# Patient Record
Sex: FEMALE | Race: BLACK OR AFRICAN AMERICAN | NOT HISPANIC OR LATINO | Employment: UNEMPLOYED | URBAN - NONMETROPOLITAN AREA
[De-identification: names, ages, dates, MRNs, and addresses within clinical notes are randomized per-mention and may not be internally consistent; named-entity substitution may affect disease eponyms.]

---

## 2018-10-30 ENCOUNTER — TRANSCRIBE ORDERS (OUTPATIENT)
Dept: PHYSICAL THERAPY | Facility: HOSPITAL | Age: 38
End: 2018-10-30

## 2018-10-30 DIAGNOSIS — M25.551 PAIN IN RIGHT HIP: Primary | ICD-10-CM

## 2018-11-08 ENCOUNTER — HOSPITAL ENCOUNTER (OUTPATIENT)
Dept: PHYSICAL THERAPY | Facility: HOSPITAL | Age: 38
Setting detail: THERAPIES SERIES
Discharge: HOME | End: 2018-11-08

## 2018-11-08 DIAGNOSIS — S72.001A CLOSED FRACTURE OF RIGHT HIP, INITIAL ENCOUNTER (HCC): Primary | ICD-10-CM

## 2018-11-08 PROCEDURE — G8979 MOBILITY GOAL STATUS: HCPCS | Performed by: PHYSICAL THERAPIST

## 2018-11-08 PROCEDURE — 97162 PT EVAL MOD COMPLEX 30 MIN: CPT | Performed by: PHYSICAL THERAPIST

## 2018-11-08 PROCEDURE — G8978 MOBILITY CURRENT STATUS: HCPCS | Performed by: PHYSICAL THERAPIST
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
## 2018-11-20 ENCOUNTER — HOSPITAL ENCOUNTER (OUTPATIENT)
Dept: PHYSICAL THERAPY | Facility: HOSPITAL | Age: 38
Setting detail: THERAPIES SERIES
Discharge: HOME | End: 2018-11-20

## 2018-11-20 DIAGNOSIS — S72.001A CLOSED FRACTURE OF RIGHT HIP, INITIAL ENCOUNTER (HCC): Primary | ICD-10-CM

## 2018-11-20 PROCEDURE — 97110 THERAPEUTIC EXERCISES: CPT | Performed by: PHYSICAL THERAPIST
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
## 2018-11-27 ENCOUNTER — HOSPITAL ENCOUNTER (OUTPATIENT)
Dept: PHYSICAL THERAPY | Facility: HOSPITAL | Age: 38
Setting detail: THERAPIES SERIES
Discharge: HOME | End: 2018-11-27

## 2018-11-27 DIAGNOSIS — S72.001A CLOSED FRACTURE OF RIGHT HIP, INITIAL ENCOUNTER (HCC): Primary | ICD-10-CM

## 2018-11-27 PROCEDURE — 97110 THERAPEUTIC EXERCISES: CPT
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
## 2018-11-29 ENCOUNTER — APPOINTMENT (OUTPATIENT)
Dept: PHYSICAL THERAPY | Facility: HOSPITAL | Age: 38
End: 2018-11-29

## 2018-12-04 ENCOUNTER — HOSPITAL ENCOUNTER (OUTPATIENT)
Dept: PHYSICAL THERAPY | Facility: HOSPITAL | Age: 38
Setting detail: THERAPIES SERIES
Discharge: HOME | End: 2018-12-04

## 2018-12-04 DIAGNOSIS — S72.001A CLOSED FRACTURE OF RIGHT HIP, INITIAL ENCOUNTER (HCC): Primary | ICD-10-CM

## 2018-12-04 PROCEDURE — 97110 THERAPEUTIC EXERCISES: CPT

## 2018-12-04 NOTE — THERAPY TREATMENT NOTE
Outpatient Physical Therapy Ortho Treatment Note  [facility]  Afua Salinas PTA       Patient Name: Lady Quiroz  : 1980  MRN: [id number]  Today's Date: 2018      Visit Date: 2018     Visits:  Insurance Visits Approved: 20 visits  Recert Due: 2018  MD Appt: TBD  Pain: pretreatment 0/10; post treatment 0/10  Improvement: pt is subjectively reporting 0% improvement since initial evaluation    Visit Dx:    ICD-10-CM ICD-9-CM   1. Closed fracture of right hip, initial encounter (CMS/[facility]) S72.001A 820.8       There is no problem list on file for this patient.       Past Medical History:   Diagnosis Date   • Hyperlipidemia    • Hypertension         Past Surgical History:   Procedure Laterality Date   • HIP FRACTURE SURGERY Right 2006   • LAPAROSCOPIC TUBAL LIGATION  10/2016       PT Ortho     Row Name 18 1000       Subjective Comments    Subjective Comments  patient reiterates her fear of water. states that she usually never goes any deeper than her knees. denies pain post treatment. states that the exercises are harder at home on land  -       Precautions and Contraindications    Precautions/Limitations  no known precautions/limitations  -       Subjective Pain    Able to rate subjective pain?  yes  -    Pre-Treatment Pain Level  0  -    Post-Treatment Pain Level  0  -      User Key  (r) = Recorded By, (t) = Taken By, (c) = Cosigned By    Initials Name Provider Type     Afua Salinas PTA Physical Therapy Assistant                      PT Assessment/Plan     Row Name 18 1000          PT Assessment    Assessment Comments  did well despite fear of new therex completed today. pt too uncomfortable in water to allow for bicycling with feet not touching the floor. pt indepenent iwth basic pool exercises  -        PT Plan    PT Frequency  2x/week pool only  -     PT Plan Comments  float steps  -       User Key  (r) = Recorded By, (t) = Taken By,  (c) = Cosigned By    Initials Name Provider Type     Afua Salinas PTA Physical Therapy Assistant              Exercises     Row Name 12/04/18 1000             Subjective Comments    Subjective Comments  patient reiterates her fear of water. states that she usually never goes any deeper than her knees. denies pain post treatment. states that the exercises are harder at home on land  -         Subjective Pain    Able to rate subjective pain?  yes  -      Pre-Treatment Pain Level  0  -      Post-Treatment Pain Level  0  -         Aquatics    Aquatics performed?  Yes  -         Exercise 1    Exercise Name 1  aqua: Cookie Walk fwd, retro, lat  -      Time 1  5 minutes each  -         Exercise 2    Exercise Name 2  aqua: Marching  -      Time 2  2 minutes  -         Exercise 3    Exercise Name 3  aqua: Heel/Toe line walking  -      Time 3  2 minutes  -         Exercise 4    Exercise Name 4  aqua: Hip Circles CW/CCW  -      Reps 4  20 each  -      Additional Comments  B  -         Exercise 5    Exercise Name 5  aqua: B St. Hamcurls  -      Reps 5  20  -MH         Exercise 6    Exercise Name 6  aqua: B St. 3 way hip   -      Reps 6  15 each  -         Exercise 7    Exercise Name 7  aqua: Lunge Cookie Push/Pull  -      Reps 7  20  -MH      Additional Comments  B lead  -         Exercise 8    Exercise Name 8  aqua: Cookie 2H push down  -      Reps 8  20  -MH         Exercise 9    Exercise Name 9  aqua: mini squats  -      Reps 9  20  -MH        User Key  (r) = Recorded By, (t) = Taken By, (c) = Cosigned By    Initials Name Provider Type     Afua Salinas PTA Physical Therapy Assistant                         PT OP Goals     Row Name 12/04/18 1000          PT Short Term Goals    STG Date to Achieve  12/04/18  -     STG 1  I with small HEP instructed aquatically but xould be performed at home.  -     STG 1 Progress  Met  -     STG 2  Able o tolerate 45 minutes of aquatic  PT with no significant increase in pain.  -     STG 2 Progress  Met  -     STG 3  Able to ambulate aquatically F/R/L 5 min each with no increase in pain.  -     STG 3 Progress  Met  -     STG 4  Able to perform 10 SLR 3-way aquatically with no increase in pain.  -     STG 4 Progress  Met  -     STG 5  AROM L hip flexion >= 85°.  -     STG 5 Progress  Ongoing  -        Long Term Goals    LTG 1  Able to perform 15 reps of each aquatic ther ex.  -     LTG 1 Progress  Met;Ongoing  -     LTG 2  Able to perform 5 minutes of bike aquatically with no increase in pain.  -     LTG 2 Progress  Not Met  -     LTG 2 Progress Comments  pt fearful for feet to not touch floor  -     LTG 3  I with all aquatic ther ex.  -     LTG 3 Progress  Ongoing  -     LTG 4  I with progression of aquatic ther ex.  -     LTG 4 Progress  Ongoing  [facility]     LTG 5  D/C with a final HEP and free 30 day fitness formula [employer].  -        Time Calculation    PT Goal Re-Cert Due Date  12/04/18  -       User Key  (r) = Recorded By, (t) = Taken By, (c) = Cosigned By    Initials Name Provider Type     Afua Salinas PTA Physical Therapy Assistant          Therapy Education  Given: HEP, Symptoms/condition management, Pain management, Posture/body mechanics  Program: Reinforced  How Provided: Verbal, Demonstration  Provided to: Patient  Level of Understanding: Verbalized, Demonstrated              Time Calculation:   Start Time: 0930  Stop Time: 1017  Time Calculation (min): 47 min  Total Timed Code Minutes- PT: 47 minute(s)    Therapy Charges for Today     Code Description Service Date Service Provider Modifiers Qty    62285758783  PT THER PROC EA 15 MIN 12/4/2018 Afua Salinas PTA GP 3    04831520986 HC PT THER SUPP EA 15 MIN 12/4/2018 Afua Salinas PTA GP 1                    Afua Salinas PTA  12/4/2018

## 2018-12-06 ENCOUNTER — APPOINTMENT (OUTPATIENT)
Dept: PHYSICAL THERAPY | Facility: HOSPITAL | Age: 38
End: 2018-12-06

## 2018-12-11 ENCOUNTER — APPOINTMENT (OUTPATIENT)
Dept: PHYSICAL THERAPY | Facility: HOSPITAL | Age: 38
End: 2018-12-11

## 2018-12-13 ENCOUNTER — APPOINTMENT (OUTPATIENT)
Dept: PHYSICAL THERAPY | Facility: HOSPITAL | Age: 38
End: 2018-12-13

## 2018-12-14 ENCOUNTER — APPOINTMENT (OUTPATIENT)
Dept: PHYSICAL THERAPY | Facility: HOSPITAL | Age: 38
End: 2018-12-14

## 2018-12-17 ENCOUNTER — APPOINTMENT (OUTPATIENT)
Dept: PHYSICAL THERAPY | Facility: HOSPITAL | Age: 38
End: 2018-12-17

## 2018-12-19 ENCOUNTER — HOSPITAL ENCOUNTER (OUTPATIENT)
Dept: PHYSICAL THERAPY | Facility: HOSPITAL | Age: 38
Setting detail: THERAPIES SERIES
Discharge: HOME | End: 2018-12-19

## 2018-12-19 DIAGNOSIS — S72.001A CLOSED FRACTURE OF RIGHT HIP, INITIAL ENCOUNTER (HCC): Primary | ICD-10-CM

## 2018-12-19 PROCEDURE — 97110 THERAPEUTIC EXERCISES: CPT | Performed by: PHYSICAL THERAPIST

## 2018-12-19 PROCEDURE — G8978 MOBILITY CURRENT STATUS: HCPCS | Performed by: PHYSICAL THERAPIST

## 2018-12-19 PROCEDURE — G8979 MOBILITY GOAL STATUS: HCPCS | Performed by: PHYSICAL THERAPIST

## 2018-12-19 NOTE — THERAPY PROGRESS REPORT/RE-CERT
"    Outpatient Physical Therapy Ortho Progress Note  [facility]  Lexy Mixon, PT, DPT, CSCS       Patient Name: Lady Quiroz  : 1980  MRN: [id number]  Today's Date: 2018      Visit Date: 2018     Pt reports 0/10 pain pre treatment, 0/10 pain post treatment  Reports \"a little\"% of improvement.  Attended 5/8 visits.  Insurance available: 20 visits  Next MD appt: TBD .  Recertification: N/A    Visit Dx:    ICD-10-CM ICD-9-CM   1. Closed fracture of right hip, initial encounter (CMS/[facility]) S72.001A 820.8       Number of days off work: N/A    Changes to medications: None noted.    Changes to MD orders: None noted.     Past Medical History:   Diagnosis Date   • Hyperlipidemia    • Hypertension         Past Surgical History:   Procedure Laterality Date   • HIP FRACTURE SURGERY Right 2006   • LAPAROSCOPIC TUBAL LIGATION  10/2016       PT Ortho     Row Name 18 0900       Subjective Comments    Subjective Comments  patient rpeorts she has been on her cycle and that is why she hasn't been in. She reports she wants to be dne next visit due to some medical procedures int he new year.  -       Precautions and Contraindications    Precautions/Limitations  no known precautions/limitations  -       Subjective Pain    Able to rate subjective pain?  yes  -    Pre-Treatment Pain Level  0  -       Posture/Observations    Posture- WNL  Posture is WNL for B LE  -    Observations  Edema  -    Posture/Observations Comments  No acute distress, viisable edema in R hip in sitting.  -AJ       Hip Special Tests    Trendelenberg sign (gluteus medius weakness)  Bilateral:;Negative  -AJ    CARTER (hip vs SI pathology)  Bilateral:;Negative  -AJ    Cheko test (tightness of ITB)  Bilateral:;Negative  -AJ    Ela’s test (tightness of ITB)  Bilateral:;Negative  -AJ    Hip scour test (labral vs hip pathology)  Bilateral:;Negative  -AJ    Piriformis test (piriformis syndrome)  " Bilateral:;Negative  -AJ    FAIR test (piriformis syndrome)  Bilateral:;Negative  -AJ       Leg Length Test    Apparent  Equal  -AJ       General ROM    GENERAL ROM COMMENTS  AROm of L hip all WNL.  -AJ       Right Lower Ext    Rt Hip ABduction AROM  WNL  -AJ    Rt Hip ADduction AROM  WNL  -AJ    Rt Hip Extension AROM  14°  -AJ    Rt Hip Flexion AROM  87°  -AJ    Rt Hip External Rotation AROM  WFL  -AJ    Rt Hip Internal Rotation AROM  WFL  -AJ       MMT (Manual Muscle Testing)    General MMT Comments  B LE 5/5, some cogwheeling on R.  -AJ       Sensation    Sensation WNL?  WFL  -AJ    Light Touch  No apparent deficits  -AJ       Lower Extremity Flexibility    Hamstrings  Bilateral:;WNL  -AJ    Hip Flexors  Bilateral:;WNL  -AJ       Pathomechanics    Lower Extremity Pathomechanics  -- Nothing abnormal identified  -       Transfers    Comment (Transfers)  I with all transfers, = WB with sit to/from stand with no UE A noted.  -       Gait/Stairs Assessment/Training    Comment (Gait/Stairs)  FWB, non-antalgic gait, slowed gait.  -      User Key  (r) = Recorded By, (t) = Taken By, (c) = Cosigned By    Initials Name Provider Type    Lexy Griggs, PT Physical Therapist         Barriers to Rehab: Include significant or possible arthritic/degenerative changes that have occurred within the joint, The patient's obesity.      Safety Issues: None noted.      PT Assessment/Plan     Row Name 12/19/18 1400          PT Assessment    Functional Limitations  Limitation in home management;Limitations in community activities;Performance in leisure activities;Performance in self-care ADL;Performance in work activities  -     Impairments  Balance;Endurance;Impaired muscle endurance;Impaired muscle power;Pain;Muscle strength;Joint integrity  -     Assessment Comments  patient has met almost all goals and wishes ot be D/C'd at next visit.  -AJ     Rehab Potential  Fair  -     Patient/caregiver participated in  establishment of treatment plan and goals  Yes  -AJ     Patient would benefit from skilled therapy intervention  No  -AJ        PT Plan    PT Frequency  2x/week pool only  -AJ     Predicted Duration of Therapy Intervention (Therapy Eval)  1 week, 1 more visit after today.  -AJ     Planned CPT's?  PT THER PROC EA 15 MIN: 14097;PT THER ACT EA 15 MIN: 82584;PT THER SUPP EA 15 MIN  -     Physical Therapy Interventions (Optional Details)  aquatics exercise;balance training;strengthening;stretching  -     PT Plan Comments  D/C t next visit with a final [facility] free 30 day fitness formula membership.  -       User Key  (r) = Recorded By, (t) = Taken By, (c) = Cosigned By    Initials Name Provider Type    Lexy Griggs, PT Physical Therapist       Other therapeutic activities and/or exercises will be prescribed depending on the patients progress or lack there of.        Exercises     Row Name 12/19/18 0900             Subjective Comments    Subjective Comments  patient rpeorts she has been on her cycle and that is why she hasn't been in. She reports she wants to be dne next visit due to some medical procedures int he new year.  -         Subjective Pain    Able to rate subjective pain?  yes  -      Pre-Treatment Pain Level  0  -         Exercise 1    Exercise Name 1  ROM/strength assessment  -         Exercise 2    Exercise Name 2  aqua: ambulation F/R/L with cookie  -      Time 2  5 min each  -AJ         Exercise 3    Exercise Name 3  aqua: amb marching  -      Time 3  2 minutes  -         Exercise 4    Exercise Name 4  aqua:B Hip Circles CW/CCW  -AJ      Reps 4  20 each  -         Exercise 5    Exercise Name 5  aqua: B St. Hamcurls  -      Reps 5  20  -         Exercise 6    Exercise Name 6  aqua: B St. 3 way hip   -AJ      Reps 6  20 each  -         Exercise 7    Exercise Name 7  aqua: Lunge Cookie Push/Pull  -      Reps 7  20 each  -AJ      Additional Comments  B LE lead  -AJ          Exercise 8    Exercise Name 8  aqua: HR/TR  -      Reps 8  20 each  -         Exercise 9    Exercise Name 9  aqua: mini squats  -      Reps 9  20  -        User Key  (r) = Recorded By, (t) = Taken By, (c) = Cosigned By    Initials Name Provider Type    Lexy Griggs, PT Physical Therapist                         PT OP Goals     Row Name 12/19/18 0900          PT Short Term Goals    STG Date to Achieve  12/04/18  -     STG 1  I with small HEP instructed aquatically but xould be performed at home.  -     STG 1 Progress  Met  -     STG 2  Able o tolerate 45 minutes of aquatic PT with no significant increase in pain.  -     STG 2 Progress  Met  -     STG 3  Able to ambulate aquatically F/R/L 5 min each with no increase in pain.  -     STG 3 Progress  Met  -     STG 4  Able to perform 10 SLR 3-way aquatically with no increase in pain.  -     STG 4 Progress  Met  -     STG 5  AROM L hip flexion >= 85°.  -     STG 5 Progress  Met  -        Long Term Goals    LTG 1  Able to perform 15 reps of each aquatic ther ex.  -     LTG 1 Progress  Met  -     LTG 2  Able to perform 5 minutes of bike aquatically with no increase in pain.  -     LTG 2 Progress  Not Met  -     LTG 2 Progress Comments  Patient defers/refuses deep water due ot fear of water.  -     LTG 3  I with all aquatic ther ex.  -     LTG 3 Progress  Partially Met;Ongoing  -     LTG 4  I with progression of aquatic ther ex.  -     LTG 4 Progress  Partially Met;Ongoing  -     LTG 5  D/C with a final HEP and free 30 day fitness formula [employer]mebership.  -     LTG 5 Progress  Ongoing  -        Time Calculation    PT Goal Re-Cert Due Date  -- N/A  -       User Key  (r) = Recorded By, (t) = Taken By, (c) = Cosigned By    Initials Name Provider Type    Lexy Griggs, TITI Physical Therapist          Therapy Education  Given: HEP, Symptoms/condition management, Pain management, Posture/body  mechanics(POC)  Program: Reinforced  How Provided: Verbal, Demonstration  Provided to: Patient  Level of Understanding: Verbalized, Demonstrated    Outcome Measure Options: Lower Extremity Functional Scale (LEFS)  Lower Extremity Functional Index  Any of your usual work, housework or school activities: Moderate difficulty  Your usual hobbies, recreational or sporting activities: Quite a bit of difficulty  Getting into or out of the bath: A little bit of difficulty  Walking between rooms: No difficulty  Putting on your shoes or socks: No difficulty  Squatting: Extreme difficulty or unable to perform activity  Lifting an object, like a bag of groceries from the floor: Quite a bit of difficulty  Performing light activities around your home: A little bit of difficulty  Performing heavy activities around your home: Extreme difficulty or unable to perform activity  Getting into or out of a car: A little bit of difficulty  Walking 2 blocks: Extreme difficulty or unable to perform activity  Walking a mile: Extreme difficulty or unable to perform activity  Going up or down 10 stairs (about 1 flight of stairs): Quite a bit of difficulty  Standing for 1 hour: Extreme difficulty or unable to perform activity  Sitting for 1 hour: Extreme difficulty or unable to perform activity  Running on even ground: Extreme difficulty or unable to perform activity  Running on uneven ground: Extreme difficulty or unable to perform activity  Making sharp turns while running fast: Extreme difficulty or unable to perform activity  Hopping: Extreme difficulty or unable to perform activity  Rolling over in bed: No difficulty  Total: 26      Time Calculation:   Start Time: 0930  Stop Time: 1015  Time Calculation (min): 45 min  PT Non-Billable Time (min): 1 min(change time for pool)  Total Timed Code Minutes- PT: 44 minute(s)    Therapy Charges for Today     Code Description Service Date Service Provider Modifiers Qty    93287688844 HC PT MOBILITY  CURRENT 12/19/2018 Lexy Mixon, PT GP, CI 1    50611318315 HC PT MOBILITY PROJECTED 12/19/2018 Lexy Mixon, PT GP, CH 1    14006299180 HC PT THER PROC EA 15 MIN 12/19/2018 Lexy Mixon, PT GP 3    [id number] HC PT THER SUPP EA 15 MIN 12/19/2018 Lexy Mixon, PT GP 1          PT G-Codes  Outcome Measure Options: Lower Extremity Functional Scale (LEFS)  Total: 26  Functional Limitation: Mobility: Walking and moving around  Mobility: Walking and Moving Around Current Status (): At least 1 percent but less than 20 percent impaired, limited or restricted  Mobility: Walking and Moving Around Goal Status (): 0 percent impaired, limited or restricted         Lexy Mixon, PT, DPT, CSCS  12/19/2018

## 2018-12-21 ENCOUNTER — HOSPITAL ENCOUNTER (OUTPATIENT)
Dept: PHYSICAL THERAPY | Facility: HOSPITAL | Age: 38
Setting detail: THERAPIES SERIES
Discharge: HOME | End: 2018-12-21

## 2018-12-21 DIAGNOSIS — S72.001A CLOSED FRACTURE OF RIGHT HIP, INITIAL ENCOUNTER (HCC): Primary | ICD-10-CM

## 2018-12-21 PROCEDURE — G8979 MOBILITY GOAL STATUS: HCPCS

## 2018-12-21 PROCEDURE — 97110 THERAPEUTIC EXERCISES: CPT

## 2018-12-21 PROCEDURE — G8980 MOBILITY D/C STATUS: HCPCS

## 2018-12-21 NOTE — THERAPY DISCHARGE NOTE
Outpatient Physical Therapy Ortho Treatment Note/Discharge Summary  [facility]     Patient Name: Lady Quiroz  : 1980  MRN: [id number]  Today's Date: 2018      Visit Date: 2018  Pt reports 6/10 pain pre treatment, 2/10 pain post treatment  Reports 70% of improvement.  Attended 6/9 visits.  Insurance available: 20 visit  Next MD appt: TBD .  Recertification: N/A  Visit Dx:    ICD-10-CM ICD-9-CM   1. Closed fracture of right hip, initial encounter (CMS/[facility]) S72.001A 820.8       There is no problem list on file for this patient.       Past Medical History:   Diagnosis Date   • Hyperlipidemia    • Hypertension         Past Surgical History:   Procedure Laterality Date   • HIP FRACTURE SURGERY Right 2006   • LAPAROSCOPIC TUBAL LIGATION  10/2016       PT Ortho     Row Name 18 1100       Subjective Comments    Subjective Comments  pt aware of d/c this date. pt given free 30 day membership to fitness.  -TL       Precautions and Contraindications    Precautions/Limitations  no known precautions/limitations  -TL       Subjective Pain    Able to rate subjective pain?  yes  -TL    Pre-Treatment Pain Level  6  -TL    Post-Treatment Pain Level  2  -TL    Row Name 18 0900       Subjective Comments    Subjective Comments  patient rpeorts she has been on her cycle and that is why she hasn't been in. She reports she wants to be dne next visit due to some medical procedures int he new year.  -AJ       Precautions and Contraindications    Precautions/Limitations  no known precautions/limitations  -AJ       Subjective Pain    Able to rate subjective pain?  yes  -AJ    Pre-Treatment Pain Level  0  -AJ    Post-Treatment Pain Level  0  -AJ       Posture/Observations    Posture- WNL  Posture is WNL for B LE  -AJ    Observations  Edema  -AJ    Posture/Observations Comments  No acute distress, viisable edema in R hip in sitting.  -AJ       Hip Special Tests    Trendelenberg sign  (gluteus medius weakness)  Bilateral:;Negative  -AJ    CARTER (hip vs SI pathology)  Bilateral:;Negative  -AJ    Cheko test (tightness of ITB)  Bilateral:;Negative  -AJ    Ela’s test (tightness of ITB)  Bilateral:;Negative  -AJ    Hip scour test (labral vs hip pathology)  Bilateral:;Negative  -AJ    Piriformis test (piriformis syndrome)  Bilateral:;Negative  -AJ    FAIR test (piriformis syndrome)  Bilateral:;Negative  -AJ       Leg Length Test    Apparent  Equal  -AJ       General ROM    GENERAL ROM COMMENTS  AROm of L hip all WNL.  -AJ       Right Lower Ext    Rt Hip ABduction AROM  WNL  -AJ    Rt Hip ADduction AROM  WNL  -AJ    Rt Hip Extension AROM  14°  -AJ    Rt Hip Flexion AROM  87°  -AJ    Rt Hip External Rotation AROM  WFL  -AJ    Rt Hip Internal Rotation AROM  WFL  -AJ       MMT (Manual Muscle Testing)    General MMT Comments  B LE 5/5, some cogwheeling on R.  -AJ       Sensation    Sensation WNL?  WFL  -AJ    Light Touch  No apparent deficits  -AJ       Lower Extremity Flexibility    Hamstrings  Bilateral:;WNL  -AJ    Hip Flexors  Bilateral:;WNL  -AJ       Pathomechanics    Lower Extremity Pathomechanics  -- Nothing abnormal identified  -AJ       Transfers    Comment (Transfers)  I with all transfers, = WB with sit to/from stand with no UE A noted.  -AJ       Gait/Stairs Assessment/Training    Comment (Gait/Stairs)  FWB, non-antalgic gait, slowed gait.  -AJ      User Key  (r) = Recorded By, (t) = Taken By, (c) = Cosigned By    Initials Name Provider Type    Lexy Griggs, PT Physical Therapist    Erin Gross PTA Physical Therapy Assistant                      PT Assessment/Plan     Row Name 12/21/18 1100          PT Assessment    Assessment Comments  all goals except long term goal #2 secondary to pt does not feel comfortable in deep end. Pt cannot swim. Pt i with HEP.  -TL        PT Plan    PT Plan Comments  dc  -       User Key  (r) = Recorded By, (t) = Taken By, (c) = Cosigned By     Initials Name Provider Type    Erin Gross PTA Physical Therapy Assistant              Exercises     Row Name 12/21/18 1100             Subjective Comments    Subjective Comments  pt aware of d/c this date. pt given free 30 day membership to fitness.  -TL         Subjective Pain    Able to rate subjective pain?  yes  -TL      Pre-Treatment Pain Level  6  -TL      Post-Treatment Pain Level  2  -TL         Exercise 1    Exercise Name 1  aqua; walk fwd/back/lateral with cookie  -TL      Time 1  5 mins each direction  -TL         Exercise 2    Exercise Name 2  aqua: ms  -TL      Reps 2  20  -TL         Exercise 3    Exercise Name 3  aqua: marching  -TL      Reps 3  20  -TL         Exercise 4    Exercise Name 4  aqua; circles  -TL      Reps 4  20  -TL      Additional Comments  cc/ccw  -TL         Exercise 5    Exercise Name 5  aqua: B St. [place]  -TL      Reps 5  20  -TL         Exercise 6    Exercise Name 6  aqua: B St. 3 way hip   -TL      Reps 6  20  -TL         Exercise 7    Exercise Name 7  aqua: Lunge Cookie Push/Pull  -TL      Reps 7  20  -TL      Additional Comments  changed legs each leg  -TL         Exercise 8    Exercise Name 8  aqua: HR/TR  -TL      Reps 8  20  -TL        User Key  (r) = Recorded By, (t) = Taken By, (c) = Cosigned By    Initials Name Provider Type    Erin Gross PTA Physical Therapy Assistant                         PT OP Goals     Row Name 12/21/18 1100          PT Short Term Goals    STG Date to Achieve  12/04/18  -TL     STG 1  I with small HEP instructed aquatically but xould be performed at home.  -TL     STG 1 Progress  Met  -TL     STG 2  Able o tolerate 45 minutes of aquatic PT with no significant increase in pain.  -TL     STG 2 Progress  Met  -TL     STG 3  Able to ambulate aquatically F/R/L 5 min each with no increase in pain.  -TL     STG 3 Progress  Met  -TL     STG 4  Able to perform 10 SLR 3-way aquatically with no increase in pain.  -TL     STG 4  Progress  Met  -TL     STG 5  AROM L hip flexion >= 85°.  -TL     STG 5 Progress  Met  -TL        Long Term Goals    LTG 1  Able to perform 15 reps of each aquatic ther ex.  -TL     LTG 1 Progress  Met  -TL     LTG 2  Able to perform 5 minutes of bike aquatically with no increase in pain.  -TL     LTG 2 Progress  Not Met  -TL     LTG 3  I with all aquatic ther ex.  -TL     LTG 3 Progress  Met  -TL     LTG 4  I with progression of aquatic ther ex.  -TL     LTG 4 Progress  Met  -TL     LTG 5  D/C with a final HEP and free 30 day fitness [employer].  -TL     LTG 5 Progress  Met  -TL       User Key  (r) = Recorded By, (t) = Taken By, (c) = Cosigned By    Initials Name Provider Type    Erin Gross PTA Physical Therapy Assistant          Therapy Education  Given: HEP  Program: Reinforced  How Provided: Other (comment)  Provided to: Patient  Level of Understanding: Teach back education performed, Verbalized, Demonstrated              Time Calculation:   Start Time: 1020  Stop Time: 1100  Time Calculation (min): 40 min  Total Timed Code Minutes- PT: 40 minute(s)  Therapy Suggested Charges     Code   Minutes Charges    None           Therapy Charges for Today     Code Description Service Date Service Provider Modifiers Qty    47587174887 HC PT THER PROC EA 15 MIN 12/21/2018 Erin Key PTA GP 3    31971915197 HC PT MOBILITY PROJECTED 12/21/2018 Erin Key PTA GP, CH 1    73654879148 HC PT MOBILITY DISCHARGE 12/21/2018 Erin Key PTA GP, CI 1          PT G-Codes  Functional Limitation: Mobility: Walking and moving around  Mobility: Walking and Moving Around Goal Status (): 0 percent impaired, limited or restricted  Mobility: Walking and Moving Around Discharge Status (): At least 1 percent but less than 20 percent impaired, limited or restricted     OP PT Discharge Summary  Date of Discharge: 12/21/18  Reason for Discharge: Maximum functional potential achieved  Outcomes  Achieved: Patient able to partially acheive established goals  Discharge Destination: Home with home program  Discharge Instructions/Additional Comments: pt earned a free membership to fitness. PTA encourage pt to call or ask any questions or concerns.      Erin Key, PTA  12/21/2018